# Patient Record
Sex: MALE | Race: WHITE
[De-identification: names, ages, dates, MRNs, and addresses within clinical notes are randomized per-mention and may not be internally consistent; named-entity substitution may affect disease eponyms.]

---

## 2019-04-04 ENCOUNTER — HOSPITAL ENCOUNTER (OUTPATIENT)
Dept: HOSPITAL 59 - SUR | Age: 59
Discharge: HOME | End: 2019-04-04
Attending: ORTHOPAEDIC SURGERY
Payer: COMMERCIAL

## 2019-04-04 DIAGNOSIS — S83.232A: Primary | ICD-10-CM

## 2019-04-04 DIAGNOSIS — M22.42: ICD-10-CM

## 2019-04-04 DIAGNOSIS — J44.9: ICD-10-CM

## 2019-04-04 PROCEDURE — 29881 ARTHRS KNE SRG MNISECTMY M/L: CPT

## 2019-04-04 PROCEDURE — 01400 ANES OPN/ARTHRS KNEE JT NOS: CPT

## 2019-04-05 NOTE — OPERATIVE NOTE
DATE OF SURGERY: 04/04/2019 



Surgeon: Willie Suárez DO



PREOPERATIVE DIAGNOSIS: Torn medial meniscus of the left knee. 



POSTOPERATIVE DIAGNOSIS: 

1. Torn medial meniscus of the left knee. 

2. Chondromalacia of the patella, trochlea, and medial femoral condyle, left knee. 



OPERATION: 

1. Arthroscopic partial medial meniscectomy, left knee. 

2. Arthroscopic chondroplasty of medial femoral condyle, patella, and trochlea, left knee. 



DESCRIPTION OF PROCEDURE: This 58-year-old male was taken to the operating room and placed in the 
supine position on the operating room table where general anesthetic was administered. The left 
lower extremity was elevated, exsanguinated, and the tourniquet inflated to 300 mmHg. Arthroscopic 
knee roberts applied. Left knee prepped with Hibiclens and draped in the usual sterile fashion.



An inferolateral portal was established for the 4 mm arthroscope, and initial evaluation of the 
joint demonstrated normal appearance of the suprapatellar pouch but significant chondromalacia of 
the patellofemoral joint was identified with grade 3 changes noted in the median ridge at the 
patella as well as the center of the trochlea. This lesion on the trochlea was approximately 1.5 to 
2 cm in width and extended the entire length of the trochlea. Loose fragment or articular cartilage 
was present, and through an inferomedial portal, chondroplasty was performed there and also of the 
median ridge at the patella. It was then re-probed and it was confirmed to be stable. Loose pieces 
of articular cartilage were suctioned from the gutters. 



The medial compartment was entered, and a complex tear of the posterior horn of the medial meniscus 
was present. There were both flap and horizontal cleavage components. We resected the loose flap and 
subsequently resected the area of horizontal cleavage component which was at about the 12-o'clock 
position. The meniscus was smoothed and tapered in each direction to form a smooth, stable contoured 
surface. It was then re-probed and confirmed to be stable. 



The patient also had multiple flap tears of the articular cartilage of the lateral aspect of the 
medial femoral condyle. These were removed. There were rather extensive grade 3 changes involving 
approximately 2/3 of the articular surface of the weightbearing surface of the medial femoral 
condyle. Loose fragments of articular cartilage were removed and re-probed and confirmed to be 
stable. 



The intracondylar notch was normal. 



The lateral compartment was entered. Probing of the lateral meniscus and articular cartilage was 
normal. 



The joint was copiously irrigated and suctioned. All chips of meniscus and articular cartilage 
fragments were suctioned from the joint. The instruments were then removed and the portals 
infiltrated with 0.25% Marcaine with epinephrine. Sterile dressings applied. Tourniquet released and 
the patient taken to the recovery room in satisfactory condition. 



GROSS PATHOLOGY: This patient demonstrated grade 3 chondromalacia of the medial femoral condyle, 
trochlea, and patella. There was also a complex tear of the posterior horn of the medial meniscus as 
described above. CC: THNAH Vidal